# Patient Record
Sex: MALE | Race: WHITE | Employment: UNEMPLOYED | URBAN - METROPOLITAN AREA
[De-identification: names, ages, dates, MRNs, and addresses within clinical notes are randomized per-mention and may not be internally consistent; named-entity substitution may affect disease eponyms.]

---

## 2019-06-20 ENCOUNTER — HOSPITAL ENCOUNTER (EMERGENCY)
Facility: HOSPITAL | Age: 39
Discharge: LEFT BEFORE DISCHARGE | End: 2019-06-20
Attending: EMERGENCY MEDICINE

## 2019-06-20 VITALS
WEIGHT: 260 LBS | DIASTOLIC BLOOD PRESSURE: 98 MMHG | HEIGHT: 70 IN | TEMPERATURE: 100 F | SYSTOLIC BLOOD PRESSURE: 168 MMHG | OXYGEN SATURATION: 98 % | BODY MASS INDEX: 37.22 KG/M2 | HEART RATE: 61 BPM | RESPIRATION RATE: 16 BRPM

## 2019-06-20 DIAGNOSIS — R10.9 ABDOMINAL PAIN OF UNKNOWN ETIOLOGY: Primary | ICD-10-CM

## 2019-06-20 PROCEDURE — 99282 EMERGENCY DEPT VISIT SF MDM: CPT

## 2019-06-20 NOTE — ED NOTES
Pt presents to ED with lower abdominal pain that started this morning. Pt states the pain happened when he was younger and all he needs is \"pain killers\". Pt refusing to get into gown and give a urine sample at this time.  Pt continues to states all he ne

## 2019-06-20 NOTE — ED NOTES
During triage, pt interrupted this RN multiple times stating \"There is nothing wrong with me, I just need pain meds\".

## 2019-06-20 NOTE — ED PROVIDER NOTES
Patient Seen in: [facility] AND [facility] Emergency Department    History   Patient presents with:  Abdomen/Flank Pain (GI/)    Stated Complaint:     HPI    42 y/o male w/ no reported PMH and no reported abdominal surgeries here for eval of several hours of d includes undifferentiated abdominal pain. ED Course   Labs Reviewed - No data to display           MDM   Pt would not lie down on bed and let me examine his abdomen.   I explained to him that I was not able to prescribe medication without evaluating hi

## 2019-06-20 NOTE — ED INITIAL ASSESSMENT (HPI)
Pt c/o generalized abd pain and low back pain for several hours. Pt denies n/v/d, fever/chills, urinary sx, injury/trauma.

## 2019-09-02 ENCOUNTER — HOSPITAL ENCOUNTER (EMERGENCY)
Facility: HOSPITAL | Age: 39
Discharge: HOME | End: 2019-09-03
Attending: EMERGENCY MEDICINE
Payer: MEDICAID

## 2019-09-02 ENCOUNTER — APPOINTMENT (OUTPATIENT)
Dept: GENERAL RADIOLOGY | Facility: HOSPITAL | Age: 39
End: 2019-09-02
Attending: EMERGENCY MEDICINE
Payer: MEDICAID

## 2019-09-02 VITALS
BODY MASS INDEX: 35.79 KG/M2 | DIASTOLIC BLOOD PRESSURE: 90 MMHG | TEMPERATURE: 100 F | SYSTOLIC BLOOD PRESSURE: 148 MMHG | HEIGHT: 70 IN | OXYGEN SATURATION: 97 % | WEIGHT: 250 LBS | RESPIRATION RATE: 16 BRPM | HEART RATE: 82 BPM

## 2019-09-02 DIAGNOSIS — R53.83 FATIGUE, UNSPECIFIED TYPE: Primary | ICD-10-CM

## 2019-09-02 LAB
ANION GAP SERPL CALC-SCNC: 8 MMOL/L (ref 0–18)
BASOPHILS # BLD AUTO: 0.06 X10(3) UL (ref 0–0.2)
BASOPHILS NFR BLD AUTO: 0.6 %
BUN BLD-MCNC: 9 MG/DL (ref 7–18)
BUN/CREAT SERPL: 7.1 (ref 10–20)
CALCIUM BLD-MCNC: 8.7 MG/DL (ref 8.5–10.1)
CHLORIDE SERPL-SCNC: 104 MMOL/L (ref 98–112)
CO2 SERPL-SCNC: 28 MMOL/L (ref 21–32)
CREAT BLD-MCNC: 1.26 MG/DL (ref 0.7–1.3)
DEPRECATED RDW RBC AUTO: 43.6 FL (ref 35.1–46.3)
EOSINOPHIL # BLD AUTO: 0.23 X10(3) UL (ref 0–0.7)
EOSINOPHIL NFR BLD AUTO: 2.4 %
ERYTHROCYTE [DISTWIDTH] IN BLOOD BY AUTOMATED COUNT: 13.4 % (ref 11–15)
GLUCOSE BLD-MCNC: 130 MG/DL (ref 70–99)
HCT VFR BLD AUTO: 47.1 % (ref 39–53)
HGB BLD-MCNC: 16.8 G/DL (ref 13–17.5)
IMM GRANULOCYTES # BLD AUTO: 0.03 X10(3) UL (ref 0–1)
IMM GRANULOCYTES NFR BLD: 0.3 %
LYMPHOCYTES # BLD AUTO: 2.74 X10(3) UL (ref 1–4)
LYMPHOCYTES NFR BLD AUTO: 28.6 %
MCH RBC QN AUTO: 31.5 PG (ref 26–34)
MCHC RBC AUTO-ENTMCNC: 35.7 G/DL (ref 31–37)
MCV RBC AUTO: 88.4 FL (ref 80–100)
MONOCYTES # BLD AUTO: 0.82 X10(3) UL (ref 0.1–1)
MONOCYTES NFR BLD AUTO: 8.6 %
NEUTROPHILS # BLD AUTO: 5.7 X10 (3) UL (ref 1.5–7.7)
NEUTROPHILS # BLD AUTO: 5.7 X10(3) UL (ref 1.5–7.7)
NEUTROPHILS NFR BLD AUTO: 59.5 %
OSMOLALITY SERPL CALC.SUM OF ELEC: 290 MOSM/KG (ref 275–295)
PLATELET # BLD AUTO: 226 10(3)UL (ref 150–450)
POTASSIUM SERPL-SCNC: 3.3 MMOL/L (ref 3.5–5.1)
RBC # BLD AUTO: 5.33 X10(6)UL (ref 4.3–5.7)
SODIUM SERPL-SCNC: 140 MMOL/L (ref 136–145)
T4 FREE SERPL-MCNC: 0.9 NG/DL (ref 0.8–1.7)
TSI SER-ACNC: 5.41 MIU/ML (ref 0.36–3.74)
WBC # BLD AUTO: 9.6 X10(3) UL (ref 4–11)

## 2019-09-02 PROCEDURE — 96360 HYDRATION IV INFUSION INIT: CPT

## 2019-09-02 PROCEDURE — 80048 BASIC METABOLIC PNL TOTAL CA: CPT | Performed by: EMERGENCY MEDICINE

## 2019-09-02 PROCEDURE — 71045 X-RAY EXAM CHEST 1 VIEW: CPT | Performed by: EMERGENCY MEDICINE

## 2019-09-02 PROCEDURE — 84443 ASSAY THYROID STIM HORMONE: CPT | Performed by: EMERGENCY MEDICINE

## 2019-09-02 PROCEDURE — 85025 COMPLETE CBC W/AUTO DIFF WBC: CPT | Performed by: EMERGENCY MEDICINE

## 2019-09-02 PROCEDURE — 84439 ASSAY OF FREE THYROXINE: CPT | Performed by: EMERGENCY MEDICINE

## 2019-09-02 PROCEDURE — 99284 EMERGENCY DEPT VISIT MOD MDM: CPT

## 2019-09-02 PROCEDURE — 93005 ELECTROCARDIOGRAM TRACING: CPT

## 2019-09-02 PROCEDURE — 96361 HYDRATE IV INFUSION ADD-ON: CPT

## 2019-09-02 PROCEDURE — 93010 ELECTROCARDIOGRAM REPORT: CPT | Performed by: EMERGENCY MEDICINE

## 2019-09-03 NOTE — ED NOTES
Pt is refusing to provide a urine sample and refuses blood work. Pt states \"ganesh been sleeping in my car and ganesh been feeling under the weather, I dont need any of the test I just need a bed\".

## 2019-09-03 NOTE — ED PROVIDER NOTES
Patient Seen in: [facility] AND [facility] Emergency Department    History   Patient presents with:  Fatigue (constitutional, neurologic)    Stated Complaint: fever     HPI    Patient complains of fatigue, for weeks. Living in car due to financial issues.   No f Abnormal; Notable for the following components:       Result Value    Glucose 130 (*)     Potassium 3.3 (*)     BUN/CREA Ratio 7.1 (*)     All other components within normal limits   TSH W REFLEX TO FREE T4 - Abnormal; Notable for the following components: (primary encounter diagnosis)    Disposition:  Discharge    Follow-up:  Kit Bond MD  [address]  [phone]            Medications Prescribed:  There are no discharge medications for this patient.

## 2019-09-03 NOTE — ED INITIAL ASSESSMENT (HPI)
Fatigue starting today. +dizziness. Patient denies chest pain, cough, fevers. Patient states that he has been staying in his car recently.

## 2019-09-30 ENCOUNTER — HOSPITAL ENCOUNTER (EMERGENCY)
Facility: HOSPITAL | Age: 39
Discharge: HOME | End: 2019-09-30
Attending: EMERGENCY MEDICINE
Payer: MEDICAID

## 2019-09-30 ENCOUNTER — APPOINTMENT (OUTPATIENT)
Dept: CT IMAGING | Facility: HOSPITAL | Age: 39
End: 2019-09-30
Attending: EMERGENCY MEDICINE
Payer: MEDICAID

## 2019-09-30 VITALS
RESPIRATION RATE: 18 BRPM | TEMPERATURE: 99 F | DIASTOLIC BLOOD PRESSURE: 83 MMHG | BODY MASS INDEX: 36.94 KG/M2 | SYSTOLIC BLOOD PRESSURE: 148 MMHG | HEIGHT: 70 IN | HEART RATE: 56 BPM | OXYGEN SATURATION: 97 % | WEIGHT: 258 LBS

## 2019-09-30 DIAGNOSIS — R10.9 ABDOMINAL PAIN OF UNKNOWN ETIOLOGY: Primary | ICD-10-CM

## 2019-09-30 PROCEDURE — 74177 CT ABD & PELVIS W/CONTRAST: CPT | Performed by: EMERGENCY MEDICINE

## 2019-09-30 PROCEDURE — 87329 GIARDIA AG IA: CPT | Performed by: EMERGENCY MEDICINE

## 2019-09-30 PROCEDURE — 96361 HYDRATE IV INFUSION ADD-ON: CPT

## 2019-09-30 PROCEDURE — 85025 COMPLETE CBC W/AUTO DIFF WBC: CPT | Performed by: EMERGENCY MEDICINE

## 2019-09-30 PROCEDURE — 87272 CRYPTOSPORIDIUM AG IF: CPT | Performed by: EMERGENCY MEDICINE

## 2019-09-30 PROCEDURE — 96374 THER/PROPH/DIAG INJ IV PUSH: CPT

## 2019-09-30 PROCEDURE — 81001 URINALYSIS AUTO W/SCOPE: CPT

## 2019-09-30 PROCEDURE — 81001 URINALYSIS AUTO W/SCOPE: CPT | Performed by: EMERGENCY MEDICINE

## 2019-09-30 PROCEDURE — 80048 BASIC METABOLIC PNL TOTAL CA: CPT | Performed by: EMERGENCY MEDICINE

## 2019-09-30 PROCEDURE — 99284 EMERGENCY DEPT VISIT MOD MDM: CPT

## 2019-09-30 PROCEDURE — 96375 TX/PRO/DX INJ NEW DRUG ADDON: CPT

## 2019-09-30 RX ORDER — HYDROCODONE BITARTRATE AND ACETAMINOPHEN 5; 325 MG/1; MG/1
1 TABLET ORAL ONCE
Status: COMPLETED | OUTPATIENT
Start: 2019-09-30 | End: 2019-09-30

## 2019-09-30 RX ORDER — NAPROXEN 500 MG/1
500 TABLET ORAL 2 TIMES DAILY PRN
Qty: 20 TABLET | Refills: 0 | Status: SHIPPED | OUTPATIENT
Start: 2019-09-30 | End: 2019-10-07

## 2019-09-30 RX ORDER — ONDANSETRON 2 MG/ML
4 INJECTION INTRAMUSCULAR; INTRAVENOUS ONCE
Status: COMPLETED | OUTPATIENT
Start: 2019-09-30 | End: 2019-09-30

## 2019-09-30 RX ORDER — ONDANSETRON 4 MG/1
4 TABLET, ORALLY DISINTEGRATING ORAL EVERY 4 HOURS PRN
Qty: 10 TABLET | Refills: 0 | Status: SHIPPED | OUTPATIENT
Start: 2019-09-30 | End: 2019-10-07

## 2019-09-30 RX ORDER — KETOROLAC TROMETHAMINE 30 MG/ML
30 INJECTION, SOLUTION INTRAMUSCULAR; INTRAVENOUS ONCE
Status: COMPLETED | OUTPATIENT
Start: 2019-09-30 | End: 2019-09-30

## 2019-09-30 NOTE — ED PROVIDER NOTES
Patient Seen in: [facility] AND [facility] Emergency Department      History   Patient presents with:  Abdominal Pain    Stated Complaint: abd pain    HPI    78-year-old male without significant past medical history presents with complaints of abdominal pain.   Janna Martines motion  Psychiatric: patient is oriented X 3, there is no agitation    ED Course     Labs Reviewed   URINALYSIS WITH CULTURE REFLEX - Abnormal; Notable for the following components:       Result Value    Ketones Urine 20  (*)     Protein Urine 30  (*) by mouth every 4 (four) hours as needed for Nausea.   Qty: 10 tablet Refills: 0

## 2019-09-30 NOTE — ED PROVIDER NOTES
Signed out by previous shift to follow-up results of CT scan. Patient had been complaining of abdominal pain and low back pain. Ct Abdomen Pelvis Iv Contrast, No Oral (er)    Result Date: 9/30/2019  CONCLUSION:   Hepatic steatosis.   No acute abnormalit

## 2019-09-30 NOTE — ED INITIAL ASSESSMENT (HPI)
Pt states he is experiencing abd pain and nausea, concerned about food poisoning, states he ate chicken from [employer] yesterday evening.

## 2019-09-30 NOTE — ED NOTES
Brought patient back to allen bed. Patient refused to sit on bed after verbally consenting to sit on hallway bed. Requested to hold the empty cup for the stool sample and wait for the doctor in the front.

## 2020-08-14 ENCOUNTER — APPOINTMENT (OUTPATIENT)
Dept: CT IMAGING | Facility: HOSPITAL | Age: 40
End: 2020-08-14
Attending: EMERGENCY MEDICINE
Payer: MEDICAID

## 2020-08-14 ENCOUNTER — HOSPITAL ENCOUNTER (EMERGENCY)
Facility: HOSPITAL | Age: 40
Discharge: HOME | End: 2020-08-15
Attending: EMERGENCY MEDICINE
Payer: MEDICAID

## 2020-08-14 DIAGNOSIS — F10.920 ALCOHOLIC INTOXICATION WITHOUT COMPLICATION (HCC): ICD-10-CM

## 2020-08-14 DIAGNOSIS — R41.82 ALTERED MENTAL STATUS, UNSPECIFIED ALTERED MENTAL STATUS TYPE: Primary | ICD-10-CM

## 2020-08-14 LAB
AMPHET UR QL SCN: NEGATIVE
ANION GAP SERPL CALC-SCNC: 6 MMOL/L (ref 0–18)
BARBITURATES UR QL SCN: NEGATIVE
BASOPHILS # BLD AUTO: 0.04 X10(3) UL (ref 0–0.2)
BASOPHILS NFR BLD AUTO: 0.4 %
BENZODIAZ UR QL SCN: NEGATIVE
BUN BLD-MCNC: 8 MG/DL (ref 7–18)
BUN/CREAT SERPL: 8.8 (ref 10–20)
CALCIUM BLD-MCNC: 8.1 MG/DL (ref 8.5–10.1)
CANNABINOIDS UR QL SCN: NEGATIVE
CHLORIDE SERPL-SCNC: 106 MMOL/L (ref 98–112)
CO2 SERPL-SCNC: 27 MMOL/L (ref 21–32)
COCAINE UR QL: NEGATIVE
CREAT BLD-MCNC: 0.91 MG/DL (ref 0.7–1.3)
CREAT UR-SCNC: 39.3 MG/DL
DEPRECATED RDW RBC AUTO: 47.4 FL (ref 35.1–46.3)
EOSINOPHIL # BLD AUTO: 0.05 X10(3) UL (ref 0–0.7)
EOSINOPHIL NFR BLD AUTO: 0.5 %
ERYTHROCYTE [DISTWIDTH] IN BLOOD BY AUTOMATED COUNT: 15.1 % (ref 11–15)
ETHANOL SERPL-MCNC: 472 MG/DL (ref ?–3)
GLUCOSE BLD-MCNC: 121 MG/DL (ref 70–99)
GLUCOSE BLDC GLUCOMTR-MCNC: 127 MG/DL (ref 70–99)
HCT VFR BLD AUTO: 45.3 % (ref 39–53)
HGB BLD-MCNC: 16.3 G/DL (ref 13–17.5)
IMM GRANULOCYTES # BLD AUTO: 0.04 X10(3) UL (ref 0–1)
IMM GRANULOCYTES NFR BLD: 0.4 %
LYMPHOCYTES # BLD AUTO: 1.12 X10(3) UL (ref 1–4)
LYMPHOCYTES NFR BLD AUTO: 10.2 %
MCH RBC QN AUTO: 31.5 PG (ref 26–34)
MCHC RBC AUTO-ENTMCNC: 36 G/DL (ref 31–37)
MCV RBC AUTO: 87.6 FL (ref 80–100)
MDMA UR QL SCN: NEGATIVE
METHADONE UR QL SCN: NEGATIVE
MONOCYTES # BLD AUTO: 0.98 X10(3) UL (ref 0.1–1)
MONOCYTES NFR BLD AUTO: 8.9 %
NEUTROPHILS # BLD AUTO: 8.73 X10 (3) UL (ref 1.5–7.7)
NEUTROPHILS # BLD AUTO: 8.73 X10(3) UL (ref 1.5–7.7)
NEUTROPHILS NFR BLD AUTO: 79.6 %
OPIATES UR QL SCN: NEGATIVE
OSMOLALITY SERPL CALC.SUM OF ELEC: 288 MOSM/KG (ref 275–295)
OXYCODONE UR QL SCN: NEGATIVE
PCP UR QL SCN: NEGATIVE
PLATELET # BLD AUTO: 192 10(3)UL (ref 150–450)
POTASSIUM SERPL-SCNC: 3.6 MMOL/L (ref 3.5–5.1)
RBC # BLD AUTO: 5.17 X10(6)UL (ref 3.8–5.8)
SODIUM SERPL-SCNC: 139 MMOL/L (ref 136–145)
WBC # BLD AUTO: 11 X10(3) UL (ref 4–11)

## 2020-08-14 PROCEDURE — 96372 THER/PROPH/DIAG INJ SC/IM: CPT

## 2020-08-14 PROCEDURE — 36415 COLL VENOUS BLD VENIPUNCTURE: CPT

## 2020-08-14 PROCEDURE — 80307 DRUG TEST PRSMV CHEM ANLYZR: CPT | Performed by: EMERGENCY MEDICINE

## 2020-08-14 PROCEDURE — 70450 CT HEAD/BRAIN W/O DYE: CPT | Performed by: EMERGENCY MEDICINE

## 2020-08-14 PROCEDURE — 93010 ELECTROCARDIOGRAM REPORT: CPT | Performed by: EMERGENCY MEDICINE

## 2020-08-14 PROCEDURE — 85025 COMPLETE CBC W/AUTO DIFF WBC: CPT | Performed by: EMERGENCY MEDICINE

## 2020-08-14 PROCEDURE — 80320 DRUG SCREEN QUANTALCOHOLS: CPT | Performed by: EMERGENCY MEDICINE

## 2020-08-14 PROCEDURE — 93005 ELECTROCARDIOGRAM TRACING: CPT

## 2020-08-14 PROCEDURE — A4216 STERILE WATER/SALINE, 10 ML: HCPCS | Performed by: EMERGENCY MEDICINE

## 2020-08-14 PROCEDURE — 99285 EMERGENCY DEPT VISIT HI MDM: CPT

## 2020-08-14 PROCEDURE — 82962 GLUCOSE BLOOD TEST: CPT

## 2020-08-14 PROCEDURE — 80048 BASIC METABOLIC PNL TOTAL CA: CPT | Performed by: EMERGENCY MEDICINE

## 2020-08-14 RX ORDER — AMMONIA INHALANTS 0.04 G/.3ML
0.3 INHALANT RESPIRATORY (INHALATION) ONCE
Status: COMPLETED | OUTPATIENT
Start: 2020-08-14 | End: 2020-08-14
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
## 2020-08-14 NOTE — ED NOTES
Patient arrived via EMS. Patient was found down by a [employer].  Patient not making sense when talking and having periods of apnea upon arrival. MD at bedside upon arrival. Patient had left 18G PIV established by medics; received 14mg Narcan for suspected her
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
## 2020-08-15 VITALS
OXYGEN SATURATION: 96 % | TEMPERATURE: 98 F | SYSTOLIC BLOOD PRESSURE: 126 MMHG | RESPIRATION RATE: 18 BRPM | DIASTOLIC BLOOD PRESSURE: 78 MMHG | HEART RATE: 78 BPM

## 2020-08-15 NOTE — ED NOTES
Estefania from registration made aware regarding pts real name and  as per Suzanne Sargent RN  Registration will merge pts chart

## 2020-08-15 NOTE — ED NOTES
Pt resting in bed with eyes close, respiration even and non labored, no distress noted  Dr Hui Chavez updated
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
## 2020-08-15 NOTE — ED NOTES
Pt dcd to home aox3, discharge instruction given, pt sts that he does care about his discharge instruction, belongings returned to pt by public safety

## 2020-08-15 NOTE — ED PROVIDER NOTES
On my reevaluation, patient is clinically sober. He is ambulatory with a steady gait, speaking in full sentences without slurring speech and is tolerating p.o.  involved to help provide homelessness resources and transportation.

## 2020-08-15 NOTE — ED NOTES
Pt woke up and yelling for water, ambulatory with steady gait  Dr Timbo Liu at bedside to reassess pt  Pt will be discharge to home   Deejay Serrato to provide ride for pt

## 2020-08-15 NOTE — CM/SW NOTE
Pt requesting assistance to get a ride home. Pt states he lives at [address] [place] [address] in [place]. Pt initially states that he will go there, but now states his \"ex\" will not let him in \"because she is probably with her boyfriend.  And she doesn't

## 2020-08-16 ENCOUNTER — APPOINTMENT (OUTPATIENT)
Dept: CT IMAGING | Facility: HOSPITAL | Age: 40
End: 2020-08-16
Attending: EMERGENCY MEDICINE
Payer: MEDICAID

## 2020-08-16 PROCEDURE — 72125 CT NECK SPINE W/O DYE: CPT | Performed by: EMERGENCY MEDICINE

## 2020-08-16 PROCEDURE — 70450 CT HEAD/BRAIN W/O DYE: CPT | Performed by: EMERGENCY MEDICINE
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
## 2020-08-17 NOTE — ED NOTES
Pt asleep in bed in nad at this time. Vss. Sitter and [place] pd outside room. Continuing to monitor.
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
## 2020-09-07 ENCOUNTER — APPOINTMENT (OUTPATIENT)
Dept: CT IMAGING | Facility: HOSPITAL | Age: 40
End: 2020-09-07
Attending: EMERGENCY MEDICINE
Payer: MEDICAID

## 2020-09-07 PROCEDURE — 70450 CT HEAD/BRAIN W/O DYE: CPT | Performed by: EMERGENCY MEDICINE

## 2020-09-07 PROCEDURE — 72125 CT NECK SPINE W/O DYE: CPT | Performed by: EMERGENCY MEDICINE

## 2020-09-07 NOTE — CM/SW NOTE
Patient discharged. He has no money, phone and no one can pick him up at this time. Call placed to [employer] for a taxi home for the patient. He reports he is currently staying at [facility] at [facility] [place], [place].   Awaiting res
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
## 2020-09-07 NOTE — ED NOTES
Patient stopped screaming after IM medication administration, going to CT at this time with EDT and security. Upon return will attempt to obtain urine sample and place patient on cardiac monitoring.
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
## 2020-09-07 NOTE — ED NOTES
Belongings returned to [facility] and [facility] through Emma Peter assisted patient via mobile phone for a ride home.

## 2020-09-07 NOTE — CM/SW NOTE
[employer] called back and were able to get patient a Lyft which will arrive in 5 minutes. Triage RN at [facility] advised and will let patient know.

## (undated) NOTE — LETTER
Date & Time: 8/17/2020, 12:13 AM  Patient: Duncan Newell  Encounter Provider(s):    Lindsey Monk MD          I have seen Duncan Newell 12/20/1980 and found him medically cleared for incarceration with [facility] Department.        ____

## (undated) NOTE — ED AVS SNAPSHOT
Dilcia See   MRN: [id number]    Department:  [facility] Emergency Department   Date of Visit:  9/30/2019           Disclosure     Insurance plans vary and the physician(s) referred by the ER may not be covered by your plan.  Please cont CARE PHYSICIAN AT ONCE OR RETURN IMMEDIATELY TO THE EMERGENCY DEPARTMENT. If you have been prescribed any medication(s), please fill your prescription right away and begin taking the medication(s) as directed.   If you believe that any of the medications